# Patient Record
Sex: FEMALE | Race: BLACK OR AFRICAN AMERICAN | Employment: FULL TIME | URBAN - METROPOLITAN AREA
[De-identification: names, ages, dates, MRNs, and addresses within clinical notes are randomized per-mention and may not be internally consistent; named-entity substitution may affect disease eponyms.]

---

## 2021-08-09 ENCOUNTER — HOSPITAL ENCOUNTER (OUTPATIENT)
Dept: PHYSICAL THERAPY | Age: 26
Discharge: HOME | End: 2021-08-09
Payer: COMMERCIAL

## 2021-08-09 PROCEDURE — 97161 PT EVAL LOW COMPLEX 20 MIN: CPT

## 2021-08-09 PROCEDURE — 97110 THERAPEUTIC EXERCISES: CPT

## 2021-08-09 NOTE — PROGRESS NOTES
PT DAILY TREATMENT NOTE/ LUMBAR EVAL 10-18    Patient Name: Salinas North  Date:2021  : 1995  [x]  Patient  Verified  Payor: Fina Beltrán / Plan: Marcel Hammonds PPO / Product Type: PPO /    In ANGO:0699  Out time:0900  Total Treatment Time (min): 50  Visit #: 1 of 6    Treatment Area: Separation of muscle (nontraumatic), other site [M62.08]    SUBJECTIVE  Pain Level (0-10 scale): 0/10  []constant [x]intermittent []improving []worsening []no change since onset    Any medication changes, allergies to medications, adverse drug reactions, diagnosis change, or new procedure performed?: [x] No    [] Yes (see summary sheet for update)  Subjective functional status/changes:     PLOF: functionally independent, no AD, active lifestyle  Limitations to PLOF: Unable to workout at desired levels, particularly any core based exercises; difficulty laying flat or in hooklying for prolonged periods of time; unable to lay on right side; difficulty transitioning in bed; denies difficulty with ambulation and stair negotiation   Mechanism of Injury: Diastasis recti and umbilical hernia secondary to  on 21  Current symptoms/Complaints: Overall reports no \"true pain\"; 0/10 at best; 0/10 at worst with applied pressure to abdomen; pt reports they are able to sleep through the night secondary to pain; reports a quick sensation of \"tenderness\" with certain movements but then quickly goes away; occasional right sided low back pain with unsupported sitting or when in positions that require core activation; \"any abdominal or core work is pretty weak\"; \"I really don't have any symptoms\"    Previous Treatment/Compliance: No previous bouts of PT  PMHx/Surgical Hx: Diastasis recti; umbilical hernia;  on 21; hx of \"circulation issues\" in left LE; hx of left wrist tendonitis  Work Hx: Works as a dental assistant; 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; pt wears a left wrist splint due to hx of tendonitis with work; periods of prolonged sitting at work can aggravate her right sided low back pain  Living Situation: Lives with  and infant son in a 2-story home with right railing when ascending; no steps to enter;  is currently away at boot camp but states her family is close by and very supportive  Pt Goals: \"Strengthen my core\"  Barriers: []pain [x]financial [x]time []transportation []other  Motivation: Evidenced by participation in PT eval and desire to return to PLOF  Substance use: []Alcohol []Tobacco []other:   Cognition: A & O x 3         OBJECTIVE    38 min [x]Eval                  []Re-Eval       10 min Therapeutic Exercise:  [x] See flow sheet :   Rationale: increase ROM, increase strength, improve coordination and increase proprioception to improve the patients ability to restore PLOF.     General Evaluation    Physical Therapy Evaluation - Lumbar Spine    OBJECTIVE  Posture:  Lateral Shift: [] R    [] L     [] +  [x] -  Kyphosis: [] Increased [] Decreased   [x]  WNL  Lordosis:  [x] Increased [] Decreased   [] WNL  Pelvic symmetry: [] WNL    [x] Other:  Right anterior innominate rotation; self corrected with 90/90 hip shift    Gait:  [x] Normal     [] Abnormal:     Active Movements: [] N/A   [] Too acute   [] Other:  ROM % AROM Comments:pain, area   Forward flexion 40-60 3 cm    Extension 20-30 100%    SideBend right 20-30 42 cm    SideBend left 20-30 42 cm    Rotation right 5-10 75% Pain in right flank   Rotation left 5-10 75%      Repeated Movements   Effects on present pain: produces (AK), abolishes (A), increases (incr), decreases (decr), centralizes (C), peripheral (PH), no effect (NE)   Pre-Test Sx Flexion Repeated Flexion Extension Repeated Extension Repeated SBL Repeated SBR   Standing NE NE NE NE NE NE NE     Neuro Screen [x] WNL  Myotome/Dermatome/Reflexes:  Comments:    Dural Mobility:  SLR Supine: [] R    [] L    [] +    [x] -  @ (degrees):   Slump Test: [] R    [] L    [] +    [x] -  @ (degrees):     Palpation  Moderate TTP right PSIS, glutes, and lumbar paraspinals; palpable tightness right glutes  Diastasis recti approximately measures 1 inch with supine abdominal crunch  Visible umbilical hernia notied    Strength   L(0-5) R (0-5) N/T   Hip Flexion (L1,2) 4+ 4+ []   Knee Extension (L3,4) 5 5 []   Ankle Dorsiflexion (L4) 5 5 []   Great Toe Extension (L5)   [x]   Ankle Plantarflexion (S1) 5 5 []   Knee Flexion (S1,2) 4+ 4+ []   Abdominals 3+  []   Paraspinals   [x]; pain with assuming prone   Back Rotators 4+ 4 []   Gluteus Kushal; sidelying 4 4    Hip Abduction 4- 4- []         Special Tests  Lumbar:  Lumb. Compression: [] Pos  [x] Neg               Lumbar Distraction:   [] Pos  [x] Neg      [place]:     Compression: [x] +    [] -     Thigh Thrust: [x] R    [] L    [x] +    [] -             Hip: Juan:  [] R    [] L    [] +    [] -     Scour:  [] R    [] L    [] +    [] -     Piriformis: [] R    [] L    [] +    [] -      Unable to assess hip flexibility tests due to pt reporting increased pain in right posterior hip, but will assess at future visits. However, palpable tightness noted in right piriformis and glutes         Other Tests / Comments:   Patient demonstrating independence with scooting and rolling with increased time secondary to increased pain in right posterior hip and lower back; pt required min A to perform sit>supine secondary to pain; pt unable to tolerate hooklying, supine, or reclined positions due to increase pain in right posterior hip and lower back, found some relief in these positions with pillow under right buttock; pain centered around right PSIS and SI joint    Pain Level (0-10 scale) post treatment: 0/10    ASSESSMENT/Changes in Function: Patient is a 33 yo female who presents to [facility] with c/o low back pain. Patient is status post  on 21. Patient reports hx of diastasis recti and umbilical hernia since her .  Signs and symptoms also consistent with right SIJ pathology, as evidenced by positive right SIJ tests, point tenderness on right SIJ and PSIS, and subjective reports of right sided pain throughout evaluation. Patient denied \"true pain\" in her subjective report; however, throughout evaluation was unable to tolerate hooklying, supine, and reclined positions due to reported increased pain in her right lower back and posterior hip. Patient demonstrates decreased ROM, decreased core and bilat LE strength, impaired posture, pain, diastasis recti measuring approximately 1 inch, and decreased functional mobility tolerance. Patient also presented with a right anterior innominate rotation, which was self corrected with 90/90 hip shift MET. The listed deficits impair her ability to tolerate prolonged sitting at work, perform bed mobility, and exercise at desired levels. Patient would benefit from skilled physical therapy to address listed deficits, reduce pain, and maximize functional potential.      Patient will continue to benefit from skilled PT services to modify and progress therapeutic interventions, address functional mobility deficits, address ROM deficits, address strength deficits, analyze and address soft tissue restrictions, analyze and cue movement patterns, analyze and modify body mechanics/ergonomics, assess and modify postural abnormalities and instruct in home and community integration to attain remaining goals. [x]  See Plan of Care  []  See progress note/recertification  []  See Discharge Summary         Progress towards goals / Updated goals:  Short Term Goals: To be accomplished in 3 weeks:  1. Patient will be independent and compliant with HEP to progress toward goals and restore functional mobility. Eval Status: issued at eval    2. Pt will demonstrate independent TA contraction hold for 10 seconds without compensation in order to facilitate ability to perform core strengthening exercises.    Eval Status: 3 second hold with verbal and tactile cues    3. Pt will have painfree lumbar AROM to aid in functional mechanics for ambulation/ADLs. Eval Status:   ROM % AROM Comments:pain, area   Forward flexion 40-60 3 cm    Extension 20-30 100%    SideBend right 20-30 42 cm    SideBend left 20-30 42 cm    Rotation right 5-10 75% Pain in right flank   Rotation left 5-10 75%             4. Pt will demonstrate improved tolerance for hooklying position for at least 5 minutes in order to facilitate participation in therapy. Eval Status: Unable tolerate position due to increased right sided low back and hip pain    Long Term Goals: To be accomplished in 6 weeks:  1. Patient will improve FOTO score by 6 points to improve functional tolerance for performing ADLs and exercising at desired levels. Eval Status: FOTO 86  FOTO score = an established functional score where 100 = no disability    2. Pt will have 5/5 bilat LE and core strength to return to goals of exercising at desired levels. Eval Status:    L(0-5) R (0-5) N/T   Hip Flexion (L1,2) 4+ 4+ []   Knee Extension (L3,4) 5 5 []   Ankle Dorsiflexion (L4) 5 5 []   Great Toe Extension (L5)   [x]   Ankle Plantarflexion (S1) 5 5 []   Knee Flexion (S1,2) 4+ 4+ []   Abdominals 3+  []   Paraspinals   [x]; pain with assuming prone   Back Rotators 4+ 4 []   Gluteus Kushal; sidelying 4 4    Hip Abduction 4- 4- []        4. Patient will report 1-2/10  pain in low back and right side at worst to improve activity tolerance and restore prior level of function.   Eval Status: 0/10 at worst during subjective interview; however patient reporting significant discomfort throughout eval in hooklyling, supine, and reclined positions     PLAN  [x]  Upgrade activities as tolerated     [x]  Continue plan of care  [x]  Update interventions per flow sheet       []  Discharge due to:_  []  Other:_      Aiyana Bailey, PT 8/9/2021  8:13 AM

## 2021-08-09 NOTE — PROGRESS NOTES
[facility]  [address] [facility]  [facility], [address]  Ph [phone]  Fx [phone]    Plan of Care/ Statement of Necessity for Physical Therapy Services    Patient name: Maxwell Scruggs Start of Care: 2021   Referral source: Ayanna Mcdonough MD : 1995    Medical Diagnosis: Separation of muscle (nontraumatic), other site [M62.08]   Onset Date:21    Treatment Diagnosis: Low back pain; diastasis recti                                              ICD-10: M62.08; M54.5   Prior Hospitalization: see medical history Provider#: [id number]   Medications: Verified on Patient summary List    Comorbidities: Diastasis recti; umbilical hernia;  on 21; hx of \"circulation issues\" in left LE; hx of left wrist tendonitis   Prior Level of Function: functionally independent, no AD, active lifestyle      The Plan of Care and following information is based on the information from the initial evaluation. Assessment/ key information: Patient is a 33 yo female who presents to [facility] with c/o low back pain. Patient is status post  on 21. Patient reports hx of diastasis recti and umbilical hernia since her . Signs and symptoms also consistent with right SIJ pathology, as evidenced by positive right SIJ tests, point tenderness on right SIJ and PSIS, and subjective reports of right sided pain throughout evaluation. Patient denied \"true pain\" in her subjective report; however, throughout evaluation was unable to tolerate hooklying, supine, and reclined positions due to reported increased pain in her right lower back and posterior hip. Patient demonstrates decreased ROM, decreased core and bilat LE strength, impaired posture, pain, diastasis recti measuring approximately 1 inch, and decreased functional mobility tolerance. Patient also presented with a right anterior innominate rotation, which was self corrected with 90/90 hip shift MET.  The listed deficits impair her ability to tolerate prolonged sitting at work, perform bed mobility, and exercise at desired levels. Patient would benefit from skilled physical therapy to address listed deficits, reduce pain, and maximize functional potential.       Patient will continue to benefit from skilled PT services to modify and progress therapeutic interventions, address functional mobility deficits, address ROM deficits, address strength deficits, analyze and address soft tissue restrictions, analyze and cue movement patterns, analyze and modify body mechanics/ergonomics, assess and modify postural abnormalities and instruct in home and community integration to attain remaining goals. Evaluation Complexity History HIGH Complexity :3+ comorbidities / personal factors will impact the outcome/ POC ; Examination MEDIUM Complexity : 3 Standardized tests and measures addressing body structure, function, activity limitation and / or participation in recreation  ;Presentation MEDIUM Complexity : Evolving with changing characteristics  ; Clinical Decision Making LOW Complexity : FOTO score of  : FOTO score = an established functional score where 100 = no disability  Overall Complexity Rating: LOW     Problem List: pain affecting function, decrease ROM, decrease strength, edema affecting function, decrease ADL/ functional abilitiies, decrease activity tolerance, decrease flexibility/ joint mobility and decrease transfer abilities   Treatment Plan may include any combination of the following: Therapeutic exercise, Therapeutic activities, Neuromuscular re-education, Physical agent/modality, Manual therapy, Patient education, Self Care training, Functional mobility training, Home safety training and Stair training  Vasopnuematic compression justification:  Per bilateral girth measures taken and listed above the edema is considered significant and having an impact on the patient's strength, balance, gait, transfers, self care and ADLs  Patient / Family readiness to learn indicated by: asking questions, trying to perform skills and interest  Persons(s) to be included in education: patient (P)  Barriers to Learning/Limitations: None  Measures taken if barriers to learning: N/A  Patient Goal (s): Strengthen my core  Patient Self Reported Health Status: good  Rehabilitation Potential: good    Short Term Goals: To be accomplished in 3 weeks:  1. Patient will be independent and compliant with HEP to progress toward goals and restore functional mobility. Eval Status: issued at [facility]     2. Pt will demonstrate independent TA contraction hold for 10 seconds without compensation in order to facilitate ability to perform core strengthening exercises. Eval Status: 3 second hold with verbal and tactile cues     3. Pt will have painfree lumbar AROM to aid in functional mechanics for ambulation/ADLs. Eval Status:   ROM % AROM Comments:pain, area   Forward flexion 40-60 3 cm     Extension 20-30 100%     SideBend right 20-30 42 cm     SideBend left 20-30 42 cm     Rotation right 5-10 75% Pain in right flank   Rotation left 5-10 75%               4. Pt will demonstrate improved tolerance for hooklying position for at least 5 minutes in order to facilitate participation in therapy. Eval Status: Unable tolerate position due to increased right sided low back and hip pain     Long Term Goals: To be accomplished in 6 weeks:  1. Patient will improve FOTO score by 6 points to improve functional tolerance for performing ADLs and exercising at desired levels. Eval Status: FOTO 86  FOTO score = an established functional score where 100 = no disability     2. Pt will have 5/5 bilat LE and core strength to return to goals of exercising at desired levels. Eval Status:     L(0-5) R (0-5) N/T   Hip Flexion (L1,2) 4+ 4+ []?    Knee Extension (L3,4) 5 5 []?    Ankle Dorsiflexion (L4) 5 5 []?    Great Toe Extension (L5)     [x]?     Ankle Plantarflexion (S1) 5 5 []?    Knee Flexion (S1,2) 4+ 4+ []?    Abdominals 3+   []?    Paraspinals     [x]? ; pain with assuming prone   Back Rotators 4+ 4 []?    Gluteus Kushal; sidelying 4 4     Hip Abduction 4- 4- []?          4. Patient will report 1-2/10  pain in low back and right side at worst to improve activity tolerance and restore prior level of function. Eval Status: 0/10 at worst during subjective interview; however patient reporting significant discomfort throughout eval in hooklyling, supine, and reclined positions     Frequency / Duration: Patient to be seen 1 times per week for 6 weeks due to pt's scheduling conflicts with work schedule. Patient/ Caregiver education and instruction: Diagnosis, prognosis, self care, activity modification and exercises   [x]  Plan of care has been reviewed with PTA    Certification Period: N/A    Lazaro Hicks, PT 8/9/2021 9:38 AM    ________________________________________________________________________    I certify that the above Therapy Services are being furnished while the patient is under my care. I agree with the treatment plan and certify that this therapy is necessary.     Physician's Signature:_____________________Date:____________TIME:________                                      Le Saavedra MD      ** Signature, Date and Time must be completed for valid certification **  Please sign and return to [facility] at THE [facility]  [address] [place], [address] [facility]  Ph [phone]  Fx [phone]

## 2021-08-17 ENCOUNTER — HOSPITAL ENCOUNTER (OUTPATIENT)
Dept: PHYSICAL THERAPY | Age: 26
Discharge: HOME | End: 2021-08-17
Payer: COMMERCIAL

## 2021-08-17 PROCEDURE — 97530 THERAPEUTIC ACTIVITIES: CPT

## 2021-08-17 PROCEDURE — 97110 THERAPEUTIC EXERCISES: CPT

## 2021-08-17 NOTE — PROGRESS NOTES
PT DAILY TREATMENT NOTE    Patient Name: Bishop Cadet  Date:2021  : 1995  [x]  Patient  Verified  Payor: Karen Robledos / Plan: Thao Lopes PPO / Product Type: PPO /    In time:1400  Out time:1441  Total Treatment Time (min): 41  Total Timed Codes (min): 41  1:1 Treatment Time ([facility] only): 41   Visit #: 2 of 6    Treatment Dx: Separation of muscle (nontraumatic), other site [M62.08]    SUBJECTIVE  Pain Level (0-10 scale): 0/10  Any medication changes, allergies to medications, adverse drug reactions, diagnosis change, or new procedure performed?: [x] No    [] Yes (see summary sheet for update)  Subjective functional status/changes:   [] No changes reported  Patient reporting she has not performed any of her HEP exercises due to busy schedule at home in the past week. States she continues to have pain with laying down and turning over in bed. OBJECTIVE    26 min Therapeutic Exercise:  [x] See flow sheet :   Rationale: increase ROM and increase strength to improve the patients ability to restore PLOF. 15 min Therapeutic Activity:  [x]  See flow sheet :   Rationale: increase strength and improve coordination  to improve the patients ability to return to prior level of physical activity. With   [x] TE   [] TA   [] neuro   [] other: Patient Education: [x] Review HEP    [] Progressed/Changed HEP based on:   [] positioning   [] body mechanics   [] transfers   [] heat/ice application    [] other:      Other Objective/Functional Measures: N/A     Pain Level (0-10 scale) post treatment: 0/10    ASSESSMENT/Changes in Function: Patient tolerated session well without any adverse effects. Reviewed HEP exercises, as pt had reported she had not completed them since IE. Patient demonstrated good technique and understanding. Additionally, initiated exercise plan, including QP pelvic tilts, QP rocks, bird dogs, SB march, and SB s.  Pt was challenged by bird dogs, requiring tactile cues to maintain TA and prevent excessive pelvic rotation. Patient continues to make good progress towards goals and would continue to benefit from skilled PT to address remaining deficits and maximize functional potential.     Patient will continue to benefit from skilled PT services to modify and progress therapeutic interventions, address functional mobility deficits, address ROM deficits, address strength deficits, analyze and address soft tissue restrictions, analyze and cue movement patterns, analyze and modify body mechanics/ergonomics, assess and modify postural abnormalities and instruct in home and community integration to attain remaining goals. [x]  See Plan of Care  []  See progress note/recertification  []  See Discharge Summary         Progress towards goals / Updated goals:  Short Term Goals: To be accomplished in 3 weeks:  1. Patient will be independent and compliant with HEP to progress toward goals and restore functional mobility. Eval Status: issued at eval  Current: Pt reporting no compliance since initial eval. Reviewed exercises today with good tolerance 8/17/21     2. Pt will demonstrate independent TA contraction hold for 10 seconds without compensation in order to facilitate ability to perform core strengthening exercises. Eval Status: 3 second hold with verbal and tactile cues     3. Pt will have painfree lumbar AROM to aid in functional mechanics for ambulation/ADLs. Eval Status:   ROM % AROM Comments:pain, area   Forward flexion 40-60 3 cm     Extension 20-30 100%     SideBend right 20-30 42 cm     SideBend left 20-30 42 cm     Rotation right 5-10 75% Pain in right flank   Rotation left 5-10 75%               4. Pt will demonstrate improved tolerance for hooklying position for at least 5 minutes in order to facilitate participation in therapy. Eval Status: Unable tolerate position due to increased right sided low back and hip pain     Long Term Goals:  To be accomplished in 6 weeks: 1. Patient will improve FOTO score by 6 points to improve functional tolerance for performing ADLs and exercising at desired levels. Eval Status: FOTO 86  FOTO score = an established functional score where 100 = no disability     2. Pt will have 5/5 bilat LE and core strength to return to goals of exercising at desired levels. Eval Status:     L(0-5) R (0-5) N/T   Hip Flexion (L1,2) 4+ 4+ []?    Knee Extension (L3,4) 5 5 []?    Ankle Dorsiflexion (L4) 5 5 []?    Great Toe Extension (L5)     [x]?     Ankle Plantarflexion (S1) 5 5 []?    Knee Flexion (S1,2) 4+ 4+ []?    Abdominals 3+   []?    Paraspinals     [x]? ; pain with assuming prone   Back Rotators 4+ 4 []?    Gluteus Kushal; sidelying 4 4     Hip Abduction 4- 4- []?          4. Patient will report 1-2/10  pain in low back and right side at worst to improve activity tolerance and restore prior level of function.   Eval Status: 0/10 at worst during subjective interview; however patient reporting significant discomfort throughout eval in hooklyling, supine, and reclined positions       PLAN  [x]  Upgrade activities as tolerated     [x]  Continue plan of care  [x]  Update interventions per flow sheet       []  Discharge due to:_  []  Other:_      Rosales Travis PT 8/17/2021  2:05 PM    Future Appointments   Date Time Provider [facility]   8/27/2021  3:30 PM Michele Montgomery PT [facility]   9/3/2021  2:45 PM Marivel Sorto [facility]   9/10/2021  2:45 PM Marivel Sorto [facility]   9/17/2021  2:45 PM Marivel Sorto, [address] [facility]   9/24/2021  3:00 PM Marivel Sorto, [address] [facility]

## 2021-08-27 ENCOUNTER — HOSPITAL ENCOUNTER (OUTPATIENT)
Dept: PHYSICAL THERAPY | Age: 26
Discharge: HOME | End: 2021-08-27
Payer: COMMERCIAL

## 2021-08-27 PROCEDURE — 97110 THERAPEUTIC EXERCISES: CPT

## 2021-08-27 PROCEDURE — 97112 NEUROMUSCULAR REEDUCATION: CPT

## 2021-08-27 PROCEDURE — 97530 THERAPEUTIC ACTIVITIES: CPT

## 2021-08-27 NOTE — PROGRESS NOTES
PT DAILY TREATMENT NOTE    Patient Name: Jaime Carpenter  Date:2021  : 1995   [x]  Patient  Verified  Payor: Josh Shea / Plan: Martin Valadez PPO / Product Type: PPO /    In time:1508  Out time:1604  Total Treatment Time (min): 56  Total Timed Codes (min): 56  1:1 Treatment Time ( only): 64   Visit #: 3 of 6     Treatment Dx: Separation of muscle (nontraumatic), other site [M62.08]    SUBJECTIVE  Pain Level (0-10 scale): 0/10  Any medication changes, allergies to medications, adverse drug reactions, diagnosis change, or new procedure performed?: [x] No    [] Yes (see summary sheet for update)  Subjective functional status/changes:   [] No changes reported  Patient reporting minimal pain. States she has been consistently performing HEP exercises since last visit as well as going to the gym. States she has been sore but \"a good sore\". OBJECTIVE    25 min Therapeutic Exercise:  [x] See flow sheet :   Rationale: increase ROM and increase strength to improve the patients ability to restore PLOF. 16 min Therapeutic Activity:  [x]  See flow sheet :   Rationale: increase strength, improve coordination, improve balance and increase proprioception  to improve the patients ability to return to prior level of physical activity. 15 min Neuromuscular Re-education:  [x]  See flow sheet :   Rationale: increase strength, improve coordination and increase proprioception  to improve the patients ability to activate TA and glutes without compensation.      With   [x] TE   [] TA   [] neuro   [] other: Patient Education: [x] Review HEP    [x] Progressed/Changed HEP based on: Plank, bird dog, QP rock, QP PPT, Tband rows/extension/paloff press  [] positioning   [] body mechanics   [] transfers   [] heat/ice application    [] other:      Other Objective/Functional Measures: Right anterior innominate rotation - corrected with MET     Pain Level (0-10 scale) post treatment: 0/10    ASSESSMENT/Changes in Function: Patient tolerated treatment session well today. Patient had no complaints with addition of plank on elbows and paloff press to exercise program to accomplish improved core stability. Patient is making steady progress with her core control, today holding TA contraction for 10 minutes without cuing and meeting her short term goal. Progressed exercises and HEP with good tolerance. Patient presented with significant right anterior innominate rotation at start of session. Attempted correction with seated hip shift but was unable to achieve full correction. Corrected with MET with good tolerance and improvement in symptoms and rotation. Patient continues to make good progress toward goals and would benefit from continued skilled PT intervention to address remaining deficits outlined in goals below. Patient will continue to benefit from skilled PT services to modify and progress therapeutic interventions, address functional mobility deficits, address ROM deficits, address strength deficits, analyze and address soft tissue restrictions, analyze and cue movement patterns, analyze and modify body mechanics/ergonomics, assess and modify postural abnormalities and instruct in home and community integration to attain remaining goals. [x]  See Plan of Care  []  See progress note/recertification  []  See Discharge Summary         Progress towards goals / Updated goals:  Short Term Goals: To be accomplished in 3 weeks:  1. Patient will be independent and compliant with HEP to progress toward goals and restore functional mobility. Eval Status: issued at eval  Current: Pt reporting no compliance since initial eval. Reviewed exercises today with good tolerance 8/17/21     2.  Pt will demonstrate independent TA contraction hold for 10 seconds without compensation in order to facilitate ability to perform core strengthening exercises.   Eval Status: 3 second hold with verbal and tactile cues  Current: 10 second hold without any cuing 8/27/21 MET    3. Pt will have painfree lumbar AROM to aid in functional mechanics for ambulation/ADLs. Eval Status:   ROM % AROM Comments:pain, area   Forward flexion 40-60 3 cm     Extension 20-30 100%     SideBend right 20-30 42 cm     SideBend left 20-30 42 cm     Rotation right 5-10 75% Pain in right flank   Rotation left 5-10 75%               4. Pt will demonstrate improved tolerance for hooklying position for at least 5 minutes in order to facilitate participation in therapy.               Eval Status: Unable tolerate position due to increased right sided low back and hip pain     Long Term Goals: To be accomplished in 6 weeks:  1. Patient will improve FOTO score by 6 points to improve functional tolerance for performing ADLs and exercising at desired levels. Eval Status: FOTO 86  FOTO score = an established functional score where 100 = no disability     2.   Pt will have 5/5 bilat LE and core strength to return to goals of exercising at desired levels. Eval Status:     L(0-5) R (0-5) N/T   Hip Flexion (L1,2) 4+ 4+ []? ?    Knee Extension (L3,4) 5 5 []? ?    Ankle Dorsiflexion (L4) 5 5 []? ?    Great Toe Extension (L5)     [x]? ?    Ankle Plantarflexion (S1) 5 5 []? ?    Knee Flexion (S1,2) 4+ 4+ []? ?    Abdominals 3+   []? ?    Paraspinals     [x]? ?; pain with assuming prone   Back Rotators 4+ 4 []? ?    Gluteus Kushal; sidelying 4 4     Hip Abduction 4- 4- []??          4.   Patient will report 1-2/10  pain in low back and right side at worst to improve activity tolerance and restore prior level of function.   Eval Status: 0/10 at worst during subjective interview; however patient reporting significant discomfort throughout eval in hooklyling, supine, and reclined positions        PLAN  [x]  Upgrade activities as tolerated     [x]  Continue plan of care  [x]  Update interventions per flow sheet       []  Discharge due to:_  []  Other:_      Veryl Emilye, PT 8/27/2021  3:13 PM    Future Appointments Date Time Provider [facility]   9/3/2021  2:45 PM David Whyte, PT [facility]   9/10/2021  2:45 PM David Whyte, PT [facility]   9/17/2021  2:45 PM David Whyte, PT [facility]   9/24/2021  3:00 PM David Whyte, PT [facility]

## 2021-09-03 ENCOUNTER — HOSPITAL ENCOUNTER (OUTPATIENT)
Dept: PHYSICAL THERAPY | Age: 26
Discharge: HOME | End: 2021-09-03
Payer: COMMERCIAL

## 2021-09-03 PROCEDURE — 97112 NEUROMUSCULAR REEDUCATION: CPT

## 2021-09-03 PROCEDURE — 97110 THERAPEUTIC EXERCISES: CPT

## 2021-09-03 NOTE — PROGRESS NOTES
PT DAILY TREATMENT NOTE    Patient Name: Sofie Piña  Date:9/3/2021  : 1995  [x]  Patient  Verified  Payor: Javier Cornelius / Plan: Stephanie Moyer PPO / Product Type: PPO /    In time:321  Out time:422  Total Treatment Time (min): 61  Total Timed Codes (min): 61  1:1 Treatment Time (MC only): 61   Visit #: 4 of 6    Treatment Dx: Separation of muscle (nontraumatic), other site [M62.08]    SUBJECTIVE  Pain Level (0-10 scale): 0/10  Any medication changes, allergies to medications, adverse drug reactions, diagnosis change, or new procedure performed?: [x] No    [] Yes (see summary sheet for update)  Subjective functional status/changes:   [] No changes reported  Pt reports that she has no pain today. She cont to have back pain with bird dogs and band exercises. OBJECTIVE      30 min Therapeutic Exercise:  [x] See flow sheet :   Rationale: increase ROM, increase strength, improve coordination, improve balance and increase proprioception to improve the patients ability to restore PLOF    31 min Neuromuscular Re-education:  [x]  See flow sheet :   Rationale: increase ROM, increase strength, improve coordination, improve balance and increase proprioception  to improve the patients ability to activate TA and core stabilizers without compensation         With   [x] TE   [] TA   [x] neuro   [] other: Patient Education: [x] Review HEP    [x] Progressed/Changed HEP based on:   [x] positioning   [x] body mechanics   [] transfers   [] heat/ice application    [] other:      Pain Level (0-10 scale) post treatment: 0/10    ASSESSMENT/Changes in Function: Patient tolerated treatment session well today. Pt reported cont no pain today. Pt has met goal for Lumbar ROM with pain free range today. Pt had no issues with addition of hip 3 way with no resistance in order to progress to holding good core stabilization during gait. Pt cont to report low back pain with bird/dog and hip abd exercises.   Recommend cont TA and PPT during exercise with decreased ROM and stop with pain. Patient continues to make steady progress toward goals and would benefit from continued skilled PT intervention to address remaining deficits outlined in goals below. Patient will continue to benefit from skilled PT services to modify and progress therapeutic interventions, address functional mobility deficits, address ROM deficits, address strength deficits, analyze and address soft tissue restrictions, analyze and cue movement patterns, analyze and modify body mechanics/ergonomics, assess and modify postural abnormalities and instruct in home and community integration to attain remaining goals. [x]  See Plan of Care  []  See progress note/recertification  []  See Discharge Summary         Progress towards goals / Updated goals:  Short Term Goals: To be accomplished in 3 weeks:  1. Patient will be independent and compliant with HEP to progress toward goals and restore functional mobility. Eval Status: issued at eval  Current: Pt reporting no compliance since initial eval. Reviewed exercises today with good tolerance 8/17/21     2. Pt will demonstrate independent TA contraction hold for 10 seconds without compensation in order to facilitate ability to perform core strengthening exercises.   Eval Status: 3 second hold with verbal and tactile cues  Current: 10 second hold without any cuing 8/27/21 MET     3. Pt will have painfree lumbar AROM to aid in functional mechanics for ambulation/ADLs.    Eval Status:   ROM % AROM Comments:pain, area   Forward flexion 40-60 3 cm     Extension 20-30 100%     SideBend right 20-30 42 cm     SideBend left 20-30 42 cm     Rotation right 5-10 100% Pain in right flank   Rotation left 5-10 75%      CURRENT: WFL no pain 9/2/21 GOAL MET            5. Pt will demonstrate improved tolerance for hooklying position for at least 5 minutes in order to facilitate participation in therapy.               Eval Status: Unable tolerate position due to increased right sided low back and hip pain     Long Term Goals: To be accomplished in 6 weeks:  1. Patient will improve FOTO score by 6 points to improve functional tolerance for performing ADLs and exercising at desired levels. Eval Status: FOTO 86  FOTO score = an established functional score where 100 = no disability     2.   Pt will have 5/5 bilat LE and core strength to return to goals of exercising at desired levels. Eval Status:     L(0-5) R (0-5) N/T   Hip Flexion (L1,2) 4+ 4+ []? ??    Knee Extension (L3,4) 5 5 []? ??    Ankle Dorsiflexion (L4) 5 5 []? ??    Great Toe Extension (L5)     [x]? ??    Ankle Plantarflexion (S1) 5 5 []? ??    Knee Flexion (S1,2) 4+ 4+ []???    Abdominals 3+   []? ??    Paraspinals     [x]? ??; pain with assuming prone   Back Rotators 4+ 4 []? ??    Gluteus Kushal; sidelying 4 4     Hip Abduction 4- 4- []???          4.   Patient will report 1-2/10  pain in low back and right side at worst to improve activity tolerance and restore prior level of function.   Eval Status: 0/10 at worst during subjective interview; however patient reporting significant discomfort throughout eval in hooklyling, supine, and reclined positions      PLAN  [x]  Upgrade activities as tolerated     [x]  Continue plan of care  [x]  Update interventions per flow sheet       []  Discharge due to:_  []  Other:_      Betty Anne, PT 9/3/2021  12:06 PM    Future Appointments   Date Time Provider [facility]   9/3/2021  2:45 PM Joseph Guillory PT [facility]   9/10/2021  2:45 PM Danny Taveras PT [facility]   9/17/2021  2:45 PM Joseph Guillory PT [facility]   9/24/2021  3:00 PM Danny Taveras PT [facility]

## 2021-09-17 ENCOUNTER — HOSPITAL ENCOUNTER (OUTPATIENT)
Dept: PHYSICAL THERAPY | Age: 26
Discharge: HOME | End: 2021-09-17
Payer: COMMERCIAL

## 2021-09-17 PROCEDURE — 97110 THERAPEUTIC EXERCISES: CPT

## 2021-09-17 PROCEDURE — 97112 NEUROMUSCULAR REEDUCATION: CPT

## 2021-09-17 NOTE — PROGRESS NOTES
PT DAILY TREATMENT NOTE    Patient Name: Natividad Young  Date:2021  : 1995  [x]  Patient  Verified  Payor: Alonso Li / Plan: Branden Rosenberg PPO / Product Type: PPO /    In time:252  Out time:346  Total Treatment Time (min): 54  Total Timed Codes (min): 54  1:1 Treatment Time (MC only): 47   Visit #: 5 of 6    Treatment Dx: Separation of muscle (nontraumatic), other site [M62.08]    SUBJECTIVE  Pain Level (0-10 scale): 0/10  Any medication changes, allergies to medications, adverse drug reactions, diagnosis change, or new procedure performed?: [x] No    [] Yes (see summary sheet for update)  Subjective functional status/changes:   [] No changes reported  Pt reported that she has not done the exercises due to having her period and she did feel some pain in her right side with walking this week. OBJECTIVE    30 min Therapeutic Exercise:  [x] See flow sheet :   Rationale: increase ROM, increase strength, improve coordination, improve balance and increase proprioception to improve the patients ability to restore PLOF    24 min Neuromuscular Re-education:  [x]  See flow sheet :   Rationale: increase ROM, increase strength, improve coordination, improve balance and increase proprioception  to improve the patients ability to activate TA without compensation       With   [x] TE   [] TA   [x] neuro   [] other: Patient Education: [x] Review HEP    [x] Progressed/Changed HEP based on:   [] positioning   [x] body mechanics   [x] transfers   [] heat/ice application    [] other:      Other Objective/Functional Measures: see goals assessment     Pain Level (0-10 scale) post treatment: 0/10    ASSESSMENT/Changes in Function: Patient tolerated treatment session well today. Patient had no complaints with addition of lower body trunk rotation stretch to exercise program to accomplish improved back pain.   Pt is progressing well toward strength goals and is able to tolerate 1-2 min in hooklying after lower body trunk rotation stretch. PT held bird dogs due to pain in low back for nowt. Patient continues to make steady progress toward goals and would benefit from continued skilled PT intervention to address remaining deficits outlined in goals below. Pt would benefit from a short extension of 1x week for 3 more weeks in order to cont to work toward pain goals. Patient will continue to benefit from skilled PT services to modify and progress therapeutic interventions, address functional mobility deficits, address ROM deficits, address strength deficits, analyze and address soft tissue restrictions, analyze and cue movement patterns, analyze and modify body mechanics/ergonomics, assess and modify postural abnormalities and instruct in home and community integration to attain remaining goals. [x]  See Plan of Care  []  See progress note/recertification  []  See Discharge Summary         Progress towards goals / Updated goals:  Short Term Goals: To be accomplished in 3 weeks:  1. Patient will be independent and compliant with HEP to progress toward goals and restore functional mobility. Eval Status: issued at [facility]  Current: Pt reporting no compliance this week 9/17/21 Progressing      2. Pt will demonstrate independent TA contraction hold for 10 seconds without compensation in order to facilitate ability to perform core strengthening exercises.   Eval Status: 3 second hold with verbal and tactile cues  Current: 10 second hold without any cuing 8/27/21 MET     3. Pt will have painfree lumbar AROM to aid in functional mechanics for ambulation/ADLs.    Eval Status:   ROM % AROM Comments:pain, area   Forward flexion 40-60 3 cm     Extension 20-30 100%     SideBend right 20-30 42 cm     SideBend left 20-30 42 cm     Rotation right 5-10 100% Pain in right flank   Rotation left 5-10 75%      CURRENT: WFL no pain 9/2/21 GOAL MET             4. Pt will demonstrate improved tolerance for hooklying position for at least 5 minutes in order to facilitate participation in therapy.               Eval Status: Unable tolerate position due to increased right sided low back and hip pain    Current: was able to tolerate hooklying after trunk rotation stretch x 2 min for core strengthening 9/17/21     Long Term Goals: To be accomplished in 6 weeks:  1. Patient will improve FOTO score by 6 points to improve functional tolerance for performing ADLs and exercising at desired levels. Eval Status: FOTO 86  FOTO score = an established functional score where 100 = no disability  Current: 62 REGRESSING 9/17/21      2.   Pt will have 5/5 bilat LE and core strength to return to goals of exercising at desired levels. Eval Status:     L(0-5) R (0-5) N/T   Hip Flexion (L1,2) 4+ 4+ []????    Knee Extension (L3,4) 5 5 []????    Ankle Dorsiflexion (L4) 5 5 []????    Great Toe Extension (L5)     [x]? ???    Ankle Plantarflexion (S1) 5 5 []????    Knee Flexion (S1,2) 4+ 4+ []????    Abdominals 3+   []????    Paraspinals     [x]????; pain with assuming prone   Back Rotators 4+ 4 []????    Gluteus Kushal; sidelying 4 4     Hip Abduction 4- 4- []????     5/5 in hip flexors, knee flexors, Cont weakness in abdominals 9/17/21 PROGRESSING     4.   Patient will report 1-2/10  pain in low back and right side at worst to improve activity tolerance and restore prior level of function.   Eval Status: 6/10 right sided pain after walking without performing exercises this week 9/17/21 PROGRESSING     PLAN  [x]  Upgrade activities as tolerated     [x]  Continue plan of care  [x]  Update interventions per flow sheet       []  Discharge due to:_  []  Other:_      Lucia Castorena, PT 9/17/2021  12:51 PM    Future Appointments   Date Time Provider [facility]   9/17/2021  2:45 PM Leonela Valdovinos, PT [facility]   9/24/2021  3:00 PM Sourav Marie, PT [facility]

## 2021-09-17 NOTE — PROGRESS NOTES
[facility]  [address] [place], [address]  Ph [phone]  Fx [phone]    Physical Therapy Progress Note  Patient name: Carlene Gill Start of Care: 2021   Referral source: René Ordonez MD : 1995                Medical Diagnosis: Separation of muscle (nontraumatic), other site [M62.08]    Onset Date:21                Treatment Diagnosis: Low back pain; diastasis recti                                              ICD-10: M62.08; M54.5   Prior Hospitalization: see medical history Provider#: [id number]   Medications: Verified on Patient summary List    Comorbidities: Diastasis recti; umbilical hernia;  on 21; hx of \"circulation issues\" in left LE; hx of left wrist tendonitis   Prior Level of Function: functionally independent, no AD, active lifestyle      Visits from Start of Care: 5    Missed Visits: 1    Updated Goals/Measure of Progress: To be achieved in 3 weeks:    Short Term Goals: To be accomplished in 3 weeks:  1. Patient will be independent and compliant with HEP to progress toward goals and restore functional mobility. Eval Status: issued at eval  Current: Pt reporting no compliance this week 21 Progressing      2. Pt will demonstrate independent TA contraction hold for 10 seconds without compensation in order to facilitate ability to perform core strengthening exercises.   Eval Status: 3 second hold with verbal and tactile cues  Current: 10 second hold without any cuing 21 MET     3. Pt will have painfree lumbar AROM to aid in functional mechanics for ambulation/ADLs.    Eval Status:   ROM % AROM Comments:pain, area   Forward flexion 40-60 3 cm     Extension 20-30 100%     SideBend right 20-30 42 cm     SideBend left 20-30 42 cm     Rotation right 5-10 100% Pain in right flank   Rotation left 5-10 75%      CURRENT: WFL no pain 21 GOAL MET             4. Pt will demonstrate improved tolerance for hooklying position for at least 5 minutes in order to facilitate participation in therapy.               Eval Status: Unable tolerate position due to increased right sided low back and hip pain                      Current: was able to tolerate hooklying after trunk rotation stretch x 2 min for core strengthening 9/17/21      Long Term Goals: To be accomplished in 6 weeks:  1. Patient will improve FOTO score by 6 points to improve functional tolerance for performing ADLs and exercising at desired levels. Eval Status: FOTO 86  FOTO score = an established functional score where 100 = no disability  Current: 62 REGRESSING 9/17/21      2.   Pt will have 5/5 bilat LE and core strength to return to goals of exercising at desired levels. Eval Status:     L(0-5) R (0-5) N/T   Hip Flexion (L1,2) 4+ 4+ []?????    Knee Extension (L3,4) 5 5 []?????    Ankle Dorsiflexion (L4) 5 5 []?????    Great Toe Extension (L5)     [x]?????    Ankle Plantarflexion (S1) 5 5 []?????    Knee Flexion (S1,2) 4+ 4+ []?????    Abdominals 3+   []?????    Paraspinals     [x]?????; pain with assuming prone   Back Rotators 4+ 4 []?????    Gluteus Kushal; sidelying 4 4     Hip Abduction 4- 4- []?????     5/5 in hip flexors, knee flexors, Cont weakness in abdominals 9/17/21 PROGRESSING     4.   Patient will report 1-2/10  pain in low back and right side at worst to improve activity tolerance and restore prior level of function. Eval Status: 6/10 right sided pain after walking without performing exercises this week 9/17/21 PROGRESSING       Summary of Care/ Key Functional Changes: Patient tolerated treatment session well today. Patient had no complaints with addition of lower body trunk rotation stretch to exercise program to accomplish improved back pain. Pt is progressing well toward strength goals and is able to tolerate 1-2 min in hooklying after lower body trunk rotation stretch. PT held bird dogs due to pain in low back for nowt.   Patient continues to make steady progress toward goals and would benefit from continued skilled PT intervention to address remaining deficits outlined in goals below. Pt would benefit from a short extension of 1x week for 3 more weeks in order to cont to work toward pain goals.     Patient will continue to benefit from skilled PT services to modify and progress therapeutic interventions, address functional mobility deficits, address ROM deficits, address strength deficits, analyze and address soft tissue restrictions, analyze and cue movement patterns, analyze and modify body mechanics/ergonomics, assess and modify postural abnormalities and instruct in home and community integration to attain remaining goals.       ASSESSMENT/RECOMMENDATIONS:  [x]Continue therapy per initial plan/protocol at a frequency of  1 x per week for 3 weeks  []Continue therapy with the following recommended changes:_____________________ _____________________________ ________________________________________  []Discontinue therapy progressing towards or have reached established goals  []Discontinue therapy due to lack of appreciable progress towards goals  []Discontinue therapy due to lack of attendance or compliance  []Await Physician's recommendations/decisions regarding therapy  []Other:________________________________________________________________    Thank you for this referral.   Penny Bai, PT 9/17/2021 3:50 PM        Physician's Signature:____________________ Date:_________ TIME:________                                      Ping Booth MD      ** Signature, Date and Time must be completed for valid certification **

## 2021-09-24 ENCOUNTER — HOSPITAL ENCOUNTER (OUTPATIENT)
Dept: PHYSICAL THERAPY | Age: 26
Discharge: HOME | End: 2021-09-24
Payer: COMMERCIAL

## 2021-09-24 PROCEDURE — 97112 NEUROMUSCULAR REEDUCATION: CPT

## 2021-09-24 PROCEDURE — 97110 THERAPEUTIC EXERCISES: CPT

## 2021-09-24 NOTE — PROGRESS NOTES
PT DAILY TREATMENT NOTE    Patient Name: Claire Mcgowan  Date:2021  : 1995  [x]  Patient  Verified  Payor: Unique Cortez / Plan: Cat Pelletier PPO / Product Type: PPO /    In time:2:58 PM  Out time:3:45 PM  Total Treatment Time (min): 52  Visit #: 6 of 6    Treatment Dx: Separation of muscle (nontraumatic), other site [M62.08]    SUBJECTIVE  Pain Level (0-10 scale): 0  Any medication changes, allergies to medications, adverse drug reactions, diagnosis change, or new procedure performed?: [x] No    [] Yes (see summary sheet for update)  Subjective functional status/changes:   [] No changes reported  Patient reports mild coccyx pain. OBJECTIVE        15 min Therapeutic Exercise:  [x] See flow sheet :   Rationale: increase ROM, increase strength and improve coordination to improve the patients ability to increase ease with ADLs      32 min Neuromuscular Re-education:  [x]  See flow sheet : core control and pelvic stability with dynadisc, pelvic mobility with SB   Rationale: increase strength, improve coordination, improve balance and increase proprioception  to improve the patients ability to improve lumbopelvic mobility/stability    With   [] TE   [] TA   [] neuro   [] other: Patient Education: [x] Review HEP    [] Progressed/Changed HEP based on:   [] positioning   [] body mechanics   [] transfers   [] heat/ice application    [] other:      Other Objective/Functional Measures: Added seated dynadisc UE/LE lifts and alternating UE/LE lifts     Pain Level (0-10 scale) post treatment: 0    ASSESSMENT/Changes in Function:   Patient severely lacking in core control. She also has a difficult time tolerating prolonged supine position secondary to c/o coccyx pain, thus majority of session was performed in seated/standing. Continue to focus on improving core awareness and functional carryover.      Patient will continue to benefit from skilled PT services to modify and progress therapeutic interventions, address functional mobility deficits, address ROM deficits, address strength deficits, analyze and address soft tissue restrictions, analyze and cue movement patterns, analyze and modify body mechanics/ergonomics and assess and modify postural abnormalities to attain remaining goals. [x]  See Plan of Care  []  See progress note/recertification  []  See Discharge Summary         Short Term Goals: To be accomplished in 3 weeks:  1. Patient will be independent and compliant with HEP to progress toward goals and restore functional mobility. Eval Status: issued at eval  Current: Pt reporting no compliance this week 9/17/21 Progressing      2. Pt will demonstrate independent TA contraction hold for 10 seconds without compensation in order to facilitate ability to perform core strengthening exercises.   Eval Status: 3 second hold with verbal and tactile cues  Current: 10 second hold without any cuing 8/27/21 MET regression, noted x3 second hold (9/24/21)     3. Pt will have painfree lumbar AROM to aid in functional mechanics for ambulation/ADLs. Eval Status:   ROM % AROM Comments:pain, area   Forward flexion 40-60 3 cm     Extension 20-30 100%     SideBend right 20-30 42 cm     SideBend left 20-30 42 cm     Rotation right 5-10 100% Pain in right flank   Rotation left 5-10 75%      CURRENT: WFL no pain 9/2/21 GOAL MET             4. Pt will demonstrate improved tolerance for hooklying position for at least 5 minutes in order to facilitate participation in therapy.               Eval Status: Unable tolerate position due to increased right sided low back and hip pain                      Current: was able to tolerate hooklying after trunk rotation stretch x 2 min for core strengthening 9/17/21      Long Term Goals: To be accomplished in 6 weeks:  1. Patient will improve FOTO score by 6 points to improve functional tolerance for performing ADLs and exercising at desired levels.   Eval Status: FOTO 86  FOTO score = an established functional score where 100 = no disability  Current: 62 REGRESSING 9/17/21      2.   Pt will have 5/5 bilat LE and core strength to return to goals of exercising at desired levels. Eval Status:     L(0-5) R (0-5) N/T   Hip Flexion (L1,2) 4+ 4+ []??????    Knee Extension (L3,4) 5 5 []??????    Ankle Dorsiflexion (L4) 5 5 []??????    Great Toe Extension (L5)     [x]??????    Ankle Plantarflexion (S1) 5 5 []??????    Knee Flexion (S1,2) 4+ 4+ []??????    Abdominals 3+   []??????    Paraspinals     [x]??????; pain with assuming prone   Back Rotators 4+ 4 []??????    Gluteus Kushal; sidelying 4 4     Hip Abduction 4- 4- []??????     5/5 in hip flexors, knee flexors, Cont weakness in abdominals 9/17/21 PROGRESSING     4.   Patient will report 1-2/10  pain in low back and right side at worst to improve activity tolerance and restore prior level of function.   Eval Status: 6/10 right sided pain after walking without performing exercises this week 9/17/21 PROGRESSING       PLAN  []  Upgrade activities as tolerated     [x]  Continue plan of care  []  Update interventions per flow sheet       []  Discharge due to:_  []  Other:_      Jose Granado 9/24/2021  3:03 PM    Future Appointments   Date Time Provider [facility]iz   10/1/2021  2:45 PM Caro Swann, PT [facility]   10/8/2021  2:45 PM Caro Swann PT [facility]

## 2021-10-01 ENCOUNTER — HOSPITAL ENCOUNTER (OUTPATIENT)
Dept: PHYSICAL THERAPY | Age: 26
Discharge: HOME | End: 2021-10-01
Payer: COMMERCIAL

## 2021-10-01 PROCEDURE — 97112 NEUROMUSCULAR REEDUCATION: CPT

## 2021-10-01 PROCEDURE — 97110 THERAPEUTIC EXERCISES: CPT

## 2021-10-01 NOTE — PROGRESS NOTES
PT DAILY TREATMENT NOTE    Patient Name: Leroy Morocho  Date:10/1/2021  : 1995  [x]  Patient  Verified  Payor: Ian Pacheco / Plan: [place] PPO / Product Type: PPO /    In time:251  Out time:335  Total Treatment Time (min): 44  Total Timed Codes (min): 44  1:1 Treatment Time (MC/BCBS only): 40   Visit #: 7 of 9    Treatment Dx: Separation of muscle (nontraumatic), other site [M62.08]    SUBJECTIVE  Pain Level (0-10 scale): 0  Any medication changes, allergies to medications, adverse drug reactions, diagnosis change, or new procedure performed?: [x] No    [] Yes (see summary sheet for update)  Subjective functional status/changes:   [] No changes reported  Pt reports pain free for a little over a week    OBJECTIVE    15 min Therapeutic Exercise:  [] See flow sheet :   Rationale: increase ROM, increase strength, improve coordination, improve balance and increase proprioception to improve the patients ability to restore PLOF     30 min Neuromuscular Re-education:  [x]  See flow sheet :   Rationale: increase ROM, increase strength, improve coordination, improve balance and increase proprioception  to improve the patients ability to activate ankle stabilizers and core steabilizers without compensation        With   [] TE   [] TA   [] neuro   [] other: Patient Education: [x] Review HEP    [] Progressed/Changed HEP based on:   [] positioning   [] body mechanics   [] transfers   [] heat/ice application    [] other:        Pain Level (0-10 scale) post treatment: 0    ASSESSMENT/Changes in Function: Patient tolerated treatment session well today. Patient had no complaints with addition of hooklying PPT and deadbugs to exercise program to accomplish improved core strength. Pt cont to not tolerate bird dog exercise. Pt was able to meet goal for supine lying.   Patient continues to make steady progress toward goals and would benefit from continued skilled PT intervention to address remaining deficits outlined in goals below. Patient will continue to benefit from skilled PT services to modify and progress therapeutic interventions, address functional mobility deficits, address ROM deficits, address strength deficits, analyze and address soft tissue restrictions, analyze and cue movement patterns, analyze and modify body mechanics/ergonomics, assess and modify postural abnormalities and instruct in home and community integration to attain remaining goals. [x]  See Plan of Care  []  See progress note/recertification  []  See Discharge Summary         Progress towards goals / Updated goals:  Short Term Goals: To be accomplished in 3 weeks:  1. Patient will be independent and compliant with HEP to progress toward goals and restore functional mobility. Eval Status: issued at eval  Current: Pt reporting no compliance this week 9/17/21 Progressing      2. Pt will demonstrate independent TA contraction hold for 10 seconds without compensation in order to facilitate ability to perform core strengthening exercises.   Eval Status: 3 second hold with verbal and tactile cues  Current: 10 second hold without any cuing 8/27/21 MET regression, noted x3 second hold (9/24/21)     3. Pt will have painfree lumbar AROM to aid in functional mechanics for ambulation/ADLs. Eval Status:   ROM % AROM Comments:pain, area   Forward flexion 40-60 3 cm     Extension 20-30 100%     SideBend right 20-30 42 cm     SideBend left 20-30 42 cm     Rotation right 5-10 100% Pain in right flank   Rotation left 5-10 75%      CURRENT: WFL no pain 9/2/21 GOAL MET             4. Pt will demonstrate improved tolerance for hooklying position for at least 5 minutes in order to facilitate participation in therapy.               Eval Status: Unable tolerate position due to increased right sided low back and hip pain                      Current: tolerated hooklying for 10 min 10/01/21 GOAL MET     Long Term Goals: To be accomplished in 6 weeks:  1.  Patient will improve FOTO score by 6 points to improve functional tolerance for performing ADLs and exercising at desired levels. Eval Status: FOTO 86  FOTO score = an established functional score where 100 = no disability  Current: 62 REGRESSING 9/17/21      2.   Pt will have 5/5 bilat LE and core strength to return to goals of exercising at desired levels. Eval Status:     L(0-5) R (0-5) N/T   Hip Flexion (L1,2) 4+ 4+ []???????    Knee Extension (L3,4) 5 5 []???????    Ankle Dorsiflexion (L4) 5 5 []???????    Great Toe Extension (L5)     [x]???????    Ankle Plantarflexion (S1) 5 5 []???????    Knee Flexion (S1,2) 4+ 4+ []???????    Abdominals 3+   []???????    Paraspinals     [x]???????; pain with assuming prone   Back Rotators 4+ 4 []???????    Gluteus Kushal; sidelying 4 4     Hip Abduction 4- 4- []???????     5/5 in hip flexors, knee flexors, Cont weakness in abdominals 9/17/21 PROGRESSING     4.   Patient will report 1-2/10  pain in low back and right side at worst to improve activity tolerance and restore prior level of function. Eval Status: 6/10 right sided pain after walking without performing exercises this week 9/17/21 PROGRESSING  Current: 0/10 for > 1 week.  PROGRESSING 10/1/21  PLAN  []  Upgrade activities as tolerated     [x]  Continue plan of care  []  Update interventions per flow sheet       []  Discharge due to:_  []  Other:_      Iven Peabody, PT 10/1/2021  9:12 AM    Future Appointments   Date Time Provider [facility] Ruiz   10/1/2021  2:45 PM Nasreen Anne PT [facility]   10/8/2021  2:45 PM Nasreen Anne PT [facility]

## 2021-10-08 ENCOUNTER — HOSPITAL ENCOUNTER (OUTPATIENT)
Dept: PHYSICAL THERAPY | Age: 26
Discharge: HOME | End: 2021-10-08
Payer: COMMERCIAL

## 2021-10-08 PROCEDURE — 97110 THERAPEUTIC EXERCISES: CPT

## 2021-10-08 NOTE — PROGRESS NOTES
[facility]  [address], [address] [facility]  Ph [phone]  Fx [phone]    Physical Therapy Discharge Summary    Patient name: Lanny Posadas of Care: 2021   Referral source: Iman Michaels MD LIT: [id number]                Medical Diagnosis: Separation of muscle (nontraumatic), other site [M62.08]    Onset Date:21                Treatment Diagnosis: Low back pain; diastasis recti                                              ICD-10: M62.08; M54.5   Prior Hospitalization: see medical history Provider#: [id number]   Medications: Verified on Patient summary List    Comorbidities: Diastasis recti; umbilical hernia;  on 21; hx of \"circulation issues\" in left LE; hx of left wrist tendonitis   Prior Level of Function: functionally independent, no AD, active lifestyle    Visits from Start of Care: 8    Missed Visits: 1    Reporting Period : 21 to 10/8/21    Goals/Measure of Progress:  Short Term Goals: To be accomplished in 3 weeks:  1. Patient will be independent and compliant with HEP to progress toward goals and restore functional mobility. Eval Status: issued at eval  Current: Pt reporting compliance daily 10/8/21 Progressing      2. Pt will demonstrate independent TA contraction hold for 10 seconds without compensation in order to facilitate ability to perform core strengthening exercises.   Eval Status: 3 second hold with verbal and tactile cues  Current: 10 second hold without any cuing 21 MET regression, noted x3 second hold (21)     3. Pt will have painfree lumbar AROM to aid in functional mechanics for ambulation/ADLs.    Eval Status:   ROM % AROM Comments:pain, area   Forward flexion 40-60 3 cm     Extension 20-30 100%     SideBend right 20-30 42 cm     SideBend left 20-30 42 cm     Rotation right 5-10 100% Pain in right flank   Rotation left 5-10 75%      CURRENT: WFL no pain 21 GOAL MET             4. Pt will demonstrate improved tolerance for hooklying position for at least 5 minutes in order to facilitate participation in therapy.               Eval Status: Unable tolerate position due to increased right sided low back and hip pain                      Current: tolerated hooklying for 10 min 10/01/21 GOAL MET     Long Term Goals: To be accomplished in 6 weeks:  1. Patient will improve FOTO score by 6 points to improve functional tolerance for performing ADLs and exercising at desired levels. Eval Status: FOTO 86  FOTO score = an established functional score where 100 = no disability  Current: 62 REGRESSING 9/17/21 10/8/21 unable to access .      2.   Pt will have 5/5 bilat LE and core strength to return to goals of exercising at desired levels. Current Status: 10/8/21 PROGRESSING     L(0-5) R (0-5) N/T   Hip Flexion (L1,2) 4+ 5 []?????????    Knee Extension (L3,4) 5 5 []?????????    Ankle Dorsiflexion (L4) 5 5 []?????????    Great Toe Extension (L5)     [x]?????????    Ankle Plantarflexion (S1) 5 5 []?????????    Knee Flexion (S1,2) 4+ 4+ []?????????    Abdominals 4   []?????????    Paraspinals     []?????????   Back Rotators 5 5 []?????????    Gluteus Kushal; sidelying 5 5     Hip Abduction 5 5 []?????????          4.   Patient will report 1-2/10  pain in low back and right side at worst to improve activity tolerance and restore prior level of function. Eval Status: 6/10 right sided pain after walking without performing exercises this week 9/17/21 PROGRESSING  Current: 0/10 for > 2 week GOAL MET 10/8/21     Assessment/ Summary of Care: Patient tolerated treatment session well today. Pt has progressed toward all goals. PT attempted to assess pts FOTO today but was unable to access  to perform survey. Pt reports no pain in 2 weeks and is now performing bird dogs during exercise routine. Pt is now DC to [facility] and current work out routine.      RECOMMENDATIONS:  [x]Discontinue therapy: [x]Patient has reached or is progressing toward set goals      []Patient is non-compliant or has abdicated      []Due to lack of appreciable progress towards set goals    Iven Peabody, PT 10/8/2021 9:27 AM

## 2021-10-08 NOTE — PROGRESS NOTES
Physical Therapy Discharge Instructions    [facility]  [address], [address] [facility]  Ph [phone]  Fx [phone]      Patient: Nichole Yanes  : 1995      Continue Home Exercise Program 1 times per day for 2 weeks, then decrease to 3 times per week      Continue with    [] Ice  as needed 1 times per day     [x] Heat           Follow up with MD:     [] Upon completion of therapy     [x] As needed      Recommendations:     [x]   Return to activity with home program    []   Return to activity with the following modifications:       []Post Rehab Program    []Join Independent aquatic program     [x]Return to/join local gym      Cristobal Dozier PT 10/8/2021 9:27 AM

## 2021-10-08 NOTE — PROGRESS NOTES
PT DAILY TREATMENT NOTE    Patient Name: Ramakrishna Felix  Date:10/8/2021  : 1995  [x]  Patient  Verified  Payor: Jessica Chaudhari / Plan: Karina Russ PPO / Product Type: PPO /    In time:300  Out time:316  Total Treatment Time (min): 16  Total Timed Codes (min): 16  1:1 Treatment Time (MC/BCBS only): 16   Visit #: 8 of 8    Treatment Dx: Separation of muscle (nontraumatic), other site [M62.08]    SUBJECTIVE  Pain Level (0-10 scale): 0/10  Any medication changes, allergies to medications, adverse drug reactions, diagnosis change, or new procedure performed?: [x] No    [] Yes (see summary sheet for update)  Subjective functional status/changes:   [] No changes reported  Pt has returned to working out 5 days a week. OBJECTIVE    16 min Therapeutic Exercise:  [x] See flow sheet : discussed return to exercise program and progression of core exercises in the future. Discussed DC plans. Rationale: increase ROM, increase strength, improve coordination, improve balance and increase proprioception to improve the patients ability to restore PLOF       With   [x] TE   [] TA   [] neuro   [] other: Patient Education: [x] Review HEP    [] Progressed/Changed HEP based on:   [] positioning   [] body mechanics   [] transfers   [] heat/ice application    [] other:      Other Objective/Functional Measures: see goals assessment below     Pain Level (0-10 scale) post treatment: 0    ASSESSMENT/Changes in Function: Patient tolerated treatment session well today. Pt has progressed toward all goals. PT attempted to assess pts FOTO today but was unable to access  to perform survey. Pt reports no pain in 2 weeks and is now performing bird dogs during exercise routine. Pt is now DC to HEP and current work out routine. [x]  See Plan of Care  []  See progress note/recertification  [x]  See Discharge Summary         Progress towards goals / Updated goals:  Short Term Goals: To be accomplished in 3 weeks:  1.  Patient will be independent and compliant with HEP to progress toward goals and restore functional mobility. Eval Status: issued at eval  Current: Pt reporting compliance daily 10/8/21 Progressing      2. Pt will demonstrate independent TA contraction hold for 10 seconds without compensation in order to facilitate ability to perform core strengthening exercises.   Eval Status: 3 second hold with verbal and tactile cues  Current: 10 second hold without any cuing 8/27/21 MET regression, noted x3 second hold (9/24/21)     3. Pt will have painfree lumbar AROM to aid in functional mechanics for ambulation/ADLs. Eval Status:   ROM % AROM Comments:pain, area   Forward flexion 40-60 3 cm     Extension 20-30 100%     SideBend right 20-30 42 cm     SideBend left 20-30 42 cm     Rotation right 5-10 100% Pain in right flank   Rotation left 5-10 75%      CURRENT: WFL no pain 9/2/21 GOAL MET             4. Pt will demonstrate improved tolerance for hooklying position for at least 5 minutes in order to facilitate participation in therapy.               Eval Status: Unable tolerate position due to increased right sided low back and hip pain                      Current: tolerated hooklying for 10 min 10/01/21 GOAL MET     Long Term Goals: To be accomplished in 6 weeks:  1. Patient will improve FOTO score by 6 points to improve functional tolerance for performing ADLs and exercising at desired levels. Eval Status: FOTO 86  FOTO score = an established functional score where 100 = no disability  Current: 62 REGRESSING 9/17/21 10/8/21 unable to access .      2.   Pt will have 5/5 bilat LE and core strength to return to goals of exercising at desired levels.   Current Status: 10/8/21 PROGRESSING     L(0-5) R (0-5) N/T   Hip Flexion (L1,2) 4+ 5 []????????    Knee Extension (L3,4) 5 5 []????????    Ankle Dorsiflexion (L4) 5 5 []????????    Great Toe Extension (L5)     [x]????????    Ankle Plantarflexion (S1) 5 5 []????????    Knee Flexion (S1,2) 4+ 4+ []????????    Abdominals 4   []????????    Paraspinals     []????????   Back Rotators 5 5 []????????    Gluteus Kushal; sidelying 5 5     Hip Abduction 5 5 []????????          4.   Patient will report 1-2/10  pain in low back and right side at worst to improve activity tolerance and restore prior level of function.   Eval Status: 6/10 right sided pain after walking without performing exercises this week 9/17/21 PROGRESSING  Current: 0/10 for > 2 week GOAL MET 10/8/21    PLAN  []  Upgrade activities as tolerated     []  Continue plan of care  []  Update interventions per flow sheet       [x]  Discharge due to HEP  []  Other:_      Pratima Zaragoza, PT 10/8/2021  9:25 AM    Future Appointments   Date Time Provider [facility] Ruiz   10/8/2021  2:45 PM Black Lauren, [address] [facility]